# Patient Record
Sex: MALE | Race: WHITE | NOT HISPANIC OR LATINO
[De-identification: names, ages, dates, MRNs, and addresses within clinical notes are randomized per-mention and may not be internally consistent; named-entity substitution may affect disease eponyms.]

---

## 2017-09-17 ENCOUNTER — RESULT REVIEW (OUTPATIENT)
Age: 62
End: 2017-09-17

## 2017-09-17 ENCOUNTER — TRANSCRIPTION ENCOUNTER (OUTPATIENT)
Age: 62
End: 2017-09-17

## 2017-09-17 ENCOUNTER — INPATIENT (INPATIENT)
Facility: HOSPITAL | Age: 62
LOS: 1 days | Discharge: HOME | DRG: 605 | End: 2017-09-19
Attending: SURGERY | Admitting: SURGERY
Payer: COMMERCIAL

## 2017-09-17 VITALS
SYSTOLIC BLOOD PRESSURE: 190 MMHG | OXYGEN SATURATION: 95 % | HEART RATE: 112 BPM | RESPIRATION RATE: 20 BRPM | DIASTOLIC BLOOD PRESSURE: 93 MMHG | TEMPERATURE: 99 F

## 2017-09-17 DIAGNOSIS — S01.01XA LACERATION WITHOUT FOREIGN BODY OF SCALP, INITIAL ENCOUNTER: ICD-10-CM

## 2017-09-17 LAB
ALBUMIN SERPL ELPH-MCNC: 4.2 G/DL — SIGNIFICANT CHANGE UP (ref 3.3–5.2)
ALP SERPL-CCNC: 94 U/L — SIGNIFICANT CHANGE UP (ref 40–120)
ALT FLD-CCNC: 36 U/L — SIGNIFICANT CHANGE UP
AMYLASE P1 CFR SERPL: 41 U/L — SIGNIFICANT CHANGE UP (ref 36–128)
ANION GAP SERPL CALC-SCNC: 18 MMOL/L — HIGH (ref 5–17)
APPEARANCE UR: CLEAR — SIGNIFICANT CHANGE UP
APTT BLD: 31 SEC — SIGNIFICANT CHANGE UP (ref 27.5–37.4)
AST SERPL-CCNC: 37 U/L — SIGNIFICANT CHANGE UP
BACTERIA # UR AUTO: SIGNIFICANT CHANGE UP
BASOPHILS # BLD AUTO: 0.1 K/UL — SIGNIFICANT CHANGE UP (ref 0–0.2)
BASOPHILS NFR BLD AUTO: 0.6 % — SIGNIFICANT CHANGE UP (ref 0–2)
BILIRUB SERPL-MCNC: 0.8 MG/DL — SIGNIFICANT CHANGE UP (ref 0.4–2)
BILIRUB UR-MCNC: NEGATIVE — SIGNIFICANT CHANGE UP
BLD GP AB SCN SERPL QL: SIGNIFICANT CHANGE UP
BUN SERPL-MCNC: 10 MG/DL — SIGNIFICANT CHANGE UP (ref 8–20)
CALCIUM SERPL-MCNC: 8.7 MG/DL — SIGNIFICANT CHANGE UP (ref 8.6–10.2)
CHLORIDE SERPL-SCNC: 102 MMOL/L — SIGNIFICANT CHANGE UP (ref 98–107)
CO2 SERPL-SCNC: 20 MMOL/L — LOW (ref 22–29)
COLOR SPEC: YELLOW — SIGNIFICANT CHANGE UP
COMMENT - URINE: SIGNIFICANT CHANGE UP
CREAT SERPL-MCNC: 1.16 MG/DL — SIGNIFICANT CHANGE UP (ref 0.5–1.3)
DIFF PNL FLD: ABNORMAL
EOSINOPHIL # BLD AUTO: 0.4 K/UL — SIGNIFICANT CHANGE UP (ref 0–0.5)
EOSINOPHIL NFR BLD AUTO: 2.6 % — SIGNIFICANT CHANGE UP (ref 0–5)
EPI CELLS # UR: SIGNIFICANT CHANGE UP
ETHANOL SERPL-MCNC: 94 MG/DL — SIGNIFICANT CHANGE UP
GLUCOSE SERPL-MCNC: 105 MG/DL — SIGNIFICANT CHANGE UP (ref 70–115)
GLUCOSE UR QL: NEGATIVE MG/DL — SIGNIFICANT CHANGE UP
HCT VFR BLD CALC: 44 % — SIGNIFICANT CHANGE UP (ref 42–52)
HGB BLD-MCNC: 15.8 G/DL — SIGNIFICANT CHANGE UP (ref 14–18)
INR BLD: 1.05 RATIO — SIGNIFICANT CHANGE UP (ref 0.88–1.16)
KETONES UR-MCNC: ABNORMAL
LACTATE SERPL-SCNC: 1.4 MMOL/L — SIGNIFICANT CHANGE UP (ref 0.5–2)
LEUKOCYTE ESTERASE UR-ACNC: NEGATIVE — SIGNIFICANT CHANGE UP
LIDOCAIN IGE QN: 40 U/L — SIGNIFICANT CHANGE UP (ref 22–51)
LYMPHOCYTES # BLD AUTO: 28.1 % — SIGNIFICANT CHANGE UP (ref 20–55)
LYMPHOCYTES # BLD AUTO: 3.9 K/UL — SIGNIFICANT CHANGE UP (ref 1–4.8)
MCHC RBC-ENTMCNC: 33.5 PG — HIGH (ref 27–31)
MCHC RBC-ENTMCNC: 35.9 G/DL — SIGNIFICANT CHANGE UP (ref 32–36)
MCV RBC AUTO: 93.4 FL — SIGNIFICANT CHANGE UP (ref 80–94)
MONOCYTES # BLD AUTO: 1.2 K/UL — HIGH (ref 0–0.8)
MONOCYTES NFR BLD AUTO: 8.9 % — SIGNIFICANT CHANGE UP (ref 3–10)
NEUTROPHILS # BLD AUTO: 8 K/UL — SIGNIFICANT CHANGE UP (ref 1.8–8)
NEUTROPHILS NFR BLD AUTO: 57.5 % — SIGNIFICANT CHANGE UP (ref 37–73)
NITRITE UR-MCNC: POSITIVE
PH UR: 7 — SIGNIFICANT CHANGE UP (ref 5–8)
PLATELET # BLD AUTO: 301 K/UL — SIGNIFICANT CHANGE UP (ref 150–400)
POTASSIUM SERPL-MCNC: 3.1 MMOL/L — LOW (ref 3.5–5.3)
POTASSIUM SERPL-SCNC: 3.1 MMOL/L — LOW (ref 3.5–5.3)
PROT SERPL-MCNC: 8.1 G/DL — SIGNIFICANT CHANGE UP (ref 6.6–8.7)
PROT UR-MCNC: 30 MG/DL
PROTHROM AB SERPL-ACNC: 11.6 SEC — SIGNIFICANT CHANGE UP (ref 9.8–12.7)
RBC # BLD: 4.71 M/UL — SIGNIFICANT CHANGE UP (ref 4.6–6.2)
RBC # FLD: 12 % — SIGNIFICANT CHANGE UP (ref 11–15.6)
RBC CASTS # UR COMP ASSIST: SIGNIFICANT CHANGE UP /HPF (ref 0–4)
SODIUM SERPL-SCNC: 140 MMOL/L — SIGNIFICANT CHANGE UP (ref 135–145)
SP GR SPEC: 1 — LOW (ref 1.01–1.02)
TYPE + AB SCN PNL BLD: SIGNIFICANT CHANGE UP
UROBILINOGEN FLD QL: NEGATIVE MG/DL — SIGNIFICANT CHANGE UP
WBC # BLD: 14 K/UL — HIGH (ref 4.8–10.8)
WBC # FLD AUTO: 14 K/UL — HIGH (ref 4.8–10.8)
WBC UR QL: SIGNIFICANT CHANGE UP

## 2017-09-17 PROCEDURE — 71010: CPT | Mod: 26

## 2017-09-17 PROCEDURE — 13121 CMPLX RPR S/A/L 2.6-7.5 CM: CPT

## 2017-09-17 PROCEDURE — 70450 CT HEAD/BRAIN W/O DYE: CPT | Mod: 26

## 2017-09-17 PROCEDURE — 71010: CPT | Mod: 26,77

## 2017-09-17 PROCEDURE — 99291 CRITICAL CARE FIRST HOUR: CPT

## 2017-09-17 PROCEDURE — 88300 SURGICAL PATH GROSS: CPT | Mod: 26

## 2017-09-17 PROCEDURE — 74177 CT ABD & PELVIS W/CONTRAST: CPT | Mod: 26

## 2017-09-17 PROCEDURE — 71260 CT THORAX DX C+: CPT | Mod: 26

## 2017-09-17 PROCEDURE — 13122 CMPLX RPR S/A/L ADDL 5 CM/>: CPT | Mod: 59

## 2017-09-17 PROCEDURE — 72125 CT NECK SPINE W/O DYE: CPT | Mod: 26

## 2017-09-17 RX ORDER — ONDANSETRON 8 MG/1
4 TABLET, FILM COATED ORAL EVERY 8 HOURS
Qty: 0 | Refills: 0 | Status: DISCONTINUED | OUTPATIENT
Start: 2017-09-17 | End: 2017-09-19

## 2017-09-17 RX ORDER — OXYCODONE AND ACETAMINOPHEN 5; 325 MG/1; MG/1
2 TABLET ORAL EVERY 4 HOURS
Qty: 0 | Refills: 0 | Status: DISCONTINUED | OUTPATIENT
Start: 2017-09-17 | End: 2017-09-19

## 2017-09-17 RX ORDER — SODIUM CHLORIDE 9 MG/ML
1000 INJECTION, SOLUTION INTRAVENOUS
Qty: 0 | Refills: 0 | Status: DISCONTINUED | OUTPATIENT
Start: 2017-09-17 | End: 2017-09-18

## 2017-09-17 RX ORDER — CEFAZOLIN SODIUM 1 G
2000 VIAL (EA) INJECTION ONCE
Qty: 0 | Refills: 0 | Status: COMPLETED | OUTPATIENT
Start: 2017-09-17 | End: 2017-09-17

## 2017-09-17 RX ORDER — ACETAMINOPHEN 500 MG
650 TABLET ORAL EVERY 6 HOURS
Qty: 0 | Refills: 0 | Status: DISCONTINUED | OUTPATIENT
Start: 2017-09-17 | End: 2017-09-19

## 2017-09-17 RX ORDER — SODIUM CHLORIDE 9 MG/ML
1000 INJECTION INTRAMUSCULAR; INTRAVENOUS; SUBCUTANEOUS ONCE
Qty: 0 | Refills: 0 | Status: COMPLETED | OUTPATIENT
Start: 2017-09-17 | End: 2017-09-17

## 2017-09-17 RX ORDER — DOCUSATE SODIUM 100 MG
100 CAPSULE ORAL THREE TIMES A DAY
Qty: 0 | Refills: 0 | Status: DISCONTINUED | OUTPATIENT
Start: 2017-09-17 | End: 2017-09-19

## 2017-09-17 RX ORDER — HYDROCORTISONE 20 MG
100 TABLET ORAL ONCE
Qty: 0 | Refills: 0 | Status: COMPLETED | OUTPATIENT
Start: 2017-09-18 | End: 2017-09-18

## 2017-09-17 RX ORDER — HEPARIN SODIUM 5000 [USP'U]/ML
5000 INJECTION INTRAVENOUS; SUBCUTANEOUS EVERY 8 HOURS
Qty: 0 | Refills: 0 | Status: DISCONTINUED | OUTPATIENT
Start: 2017-09-17 | End: 2017-09-17

## 2017-09-17 RX ORDER — POTASSIUM CHLORIDE 20 MEQ
10 PACKET (EA) ORAL
Qty: 0 | Refills: 0 | Status: COMPLETED | OUTPATIENT
Start: 2017-09-17 | End: 2017-09-17

## 2017-09-17 RX ORDER — TETANUS TOXOID, REDUCED DIPHTHERIA TOXOID AND ACELLULAR PERTUSSIS VACCINE, ADSORBED 5; 2.5; 8; 8; 2.5 [IU]/.5ML; [IU]/.5ML; UG/.5ML; UG/.5ML; UG/.5ML
0.5 SUSPENSION INTRAMUSCULAR ONCE
Qty: 0 | Refills: 0 | Status: COMPLETED | OUTPATIENT
Start: 2017-09-17 | End: 2017-09-17

## 2017-09-17 RX ORDER — HYDROCORTISONE 20 MG
50 TABLET ORAL EVERY 8 HOURS
Qty: 0 | Refills: 0 | Status: COMPLETED | OUTPATIENT
Start: 2017-09-18 | End: 2017-09-18

## 2017-09-17 RX ORDER — OXYCODONE AND ACETAMINOPHEN 5; 325 MG/1; MG/1
1 TABLET ORAL EVERY 6 HOURS
Qty: 0 | Refills: 0 | Status: DISCONTINUED | OUTPATIENT
Start: 2017-09-17 | End: 2017-09-19

## 2017-09-17 RX ORDER — SENNA PLUS 8.6 MG/1
2 TABLET ORAL AT BEDTIME
Qty: 0 | Refills: 0 | Status: DISCONTINUED | OUTPATIENT
Start: 2017-09-17 | End: 2017-09-19

## 2017-09-17 RX ORDER — FENTANYL CITRATE 50 UG/ML
50 INJECTION INTRAVENOUS
Qty: 0 | Refills: 0 | Status: DISCONTINUED | OUTPATIENT
Start: 2017-09-17 | End: 2017-09-18

## 2017-09-17 RX ORDER — IPRATROPIUM/ALBUTEROL SULFATE 18-103MCG
3 AEROSOL WITH ADAPTER (GRAM) INHALATION EVERY 6 HOURS
Qty: 0 | Refills: 0 | Status: DISCONTINUED | OUTPATIENT
Start: 2017-09-17 | End: 2017-09-18

## 2017-09-17 RX ORDER — ENOXAPARIN SODIUM 100 MG/ML
30 INJECTION SUBCUTANEOUS EVERY 12 HOURS
Qty: 0 | Refills: 0 | Status: DISCONTINUED | OUTPATIENT
Start: 2017-09-17 | End: 2017-09-17

## 2017-09-17 RX ORDER — SODIUM CHLORIDE 9 MG/ML
1000 INJECTION, SOLUTION INTRAVENOUS ONCE
Qty: 0 | Refills: 0 | Status: COMPLETED | OUTPATIENT
Start: 2017-09-17 | End: 2017-09-17

## 2017-09-17 RX ORDER — ONDANSETRON 8 MG/1
4 TABLET, FILM COATED ORAL ONCE
Qty: 0 | Refills: 0 | Status: DISCONTINUED | OUTPATIENT
Start: 2017-09-17 | End: 2017-09-18

## 2017-09-17 RX ORDER — ENOXAPARIN SODIUM 100 MG/ML
30 INJECTION SUBCUTANEOUS
Qty: 0 | Refills: 0 | Status: DISCONTINUED | OUTPATIENT
Start: 2017-09-17 | End: 2017-09-19

## 2017-09-17 RX ADMIN — SODIUM CHLORIDE 125 MILLILITER(S): 9 INJECTION, SOLUTION INTRAVENOUS at 23:50

## 2017-09-17 RX ADMIN — SODIUM CHLORIDE 1000 MILLILITER(S): 9 INJECTION, SOLUTION INTRAVENOUS at 18:34

## 2017-09-17 RX ADMIN — Medication 100 MILLIEQUIVALENT(S): at 22:42

## 2017-09-17 RX ADMIN — Medication 100 MILLIGRAM(S): at 17:10

## 2017-09-17 RX ADMIN — Medication 100 MILLIEQUIVALENT(S): at 23:38

## 2017-09-17 RX ADMIN — SODIUM CHLORIDE 125 MILLILITER(S): 9 INJECTION, SOLUTION INTRAVENOUS at 18:26

## 2017-09-17 RX ADMIN — Medication 100 MILLIEQUIVALENT(S): at 18:26

## 2017-09-17 RX ADMIN — Medication 3 MILLILITER(S): at 22:30

## 2017-09-17 RX ADMIN — SODIUM CHLORIDE 6000 MILLILITER(S): 9 INJECTION INTRAMUSCULAR; INTRAVENOUS; SUBCUTANEOUS at 17:00

## 2017-09-17 RX ADMIN — SODIUM CHLORIDE 100 MILLILITER(S): 9 INJECTION, SOLUTION INTRAVENOUS at 22:30

## 2017-09-17 RX ADMIN — TETANUS TOXOID, REDUCED DIPHTHERIA TOXOID AND ACELLULAR PERTUSSIS VACCINE, ADSORBED 0.5 MILLILITER(S): 5; 2.5; 8; 8; 2.5 SUSPENSION INTRAMUSCULAR at 17:10

## 2017-09-17 NOTE — ED PROVIDER NOTE - MEDICAL DECISION MAKING DETAILS
large laceration 15cm, forhead to midoccipital scalp region, cranium visualized. will adm for lac repair in or

## 2017-09-17 NOTE — BRIEF OPERATIVE NOTE - OPERATION/FINDINGS
complex scalp laceration measuring approximately 20 cm. Wound was irrigated with copious amounts of normal saline. Two Galea lacerations were repaired with 3-0 Vicryl simple sutures. His wound was closed complex scalp laceration measuring approximately 20 cm. Wound was irrigated with copious amounts of normal saline. Two Galea lacerations were repaired with 3-0 Vicryl simple sutures. His wound was closed with 3-0 Vicryl subcuticular simple from the middle of the laceration up to his forehead, the closure was reinforced with 5-0 Monocryl running subcuticular sutures. The bottom half of the wound was closed with interlocking running sutures using nylon.

## 2017-09-17 NOTE — H&P ADULT - PROBLEM SELECTOR PLAN 1
-plan for laceration repair in OR   -repeat lactate  -IVF, NPO  -pain medication  -hold DVT prophylaxis until tomorrow AM
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
## 2017-09-17 NOTE — ED PROVIDER NOTE - CRITICAL CARE PROVIDED
40 MINUTES/documentation/additional history taking/direct patient care (not related to procedure)/interpretation of diagnostic studies/consultation with other physicians

## 2017-09-17 NOTE — H&P ADULT - NSHPPHYSICALEXAM_GEN_ALL_CORE
Constitutional: Well-developed well nourished male in no acute distress  HEENT: Head is normocephalic. extensive forehead laceration that extends to scalp. approximately 15cm. maxillofacial structures stable, no blood or discharge from oral cavity, dried blood in left nostril, no ceja sign / raccoon eyes, EOMI b/l, pupils 3mm round and reactive to light b/l, no active drainage or redness  Neck: cervical collar in place, trachea midline  Respiratory: Breath sounds CTA b/l respirations are unlabored, no accessory muscle use, no conversational dyspnea  Cardiovascular: Regular rate & rhythm, +S1, S2  Chest: Chest wall is non-tender to palpation, no subQ emphysema or crepitus palpated  Gastrointestinal: Abdomen soft, non-tender, non-distended, no rebound tenderness / guarding, no ecchymosis or external signs of abdominal trauma  Extremities: moving all extremities spontaneously, no point tenderness or deformity noted to upper or lower extremities b/l  Pelvis: stable  Vascular: 2+ radial, femoral, and DP pulses b/l  Neurological: GCS: 15 (4/5/6). A&O x 3; no gross sensory / motor / coordination deficits  Musculoskeletal: 5/5 strength of upper and lower extremities b/l  Back: no C/T/LS spine tenderness to palpation, no step-offs or signs of external trauma to the back

## 2017-09-17 NOTE — ED ADULT NURSE REASSESSMENT NOTE - NS ED NURSE REASSESS COMMENT FT1
Report handed off to Pearl WOODARD in the OR. Pre-op checklist completed. Belongings locked with security. Valuables slip placed on chart #[id number].
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
## 2017-09-18 DIAGNOSIS — J44.9 CHRONIC OBSTRUCTIVE PULMONARY DISEASE, UNSPECIFIED: ICD-10-CM

## 2017-09-18 LAB
ANION GAP SERPL CALC-SCNC: 12 MMOL/L — SIGNIFICANT CHANGE UP (ref 5–17)
BASOPHILS # BLD AUTO: 0 K/UL — SIGNIFICANT CHANGE UP (ref 0–0.2)
BASOPHILS NFR BLD AUTO: 0.3 % — SIGNIFICANT CHANGE UP (ref 0–2)
BUN SERPL-MCNC: 10 MG/DL — SIGNIFICANT CHANGE UP (ref 8–20)
CALCIUM SERPL-MCNC: 8.5 MG/DL — LOW (ref 8.6–10.2)
CHLORIDE SERPL-SCNC: 106 MMOL/L — SIGNIFICANT CHANGE UP (ref 98–107)
CO2 SERPL-SCNC: 22 MMOL/L — SIGNIFICANT CHANGE UP (ref 22–29)
CREAT SERPL-MCNC: 0.94 MG/DL — SIGNIFICANT CHANGE UP (ref 0.5–1.3)
GLUCOSE SERPL-MCNC: 156 MG/DL — HIGH (ref 70–115)
HCT VFR BLD CALC: 41 % — LOW (ref 42–52)
HGB BLD-MCNC: 14.2 G/DL — SIGNIFICANT CHANGE UP (ref 14–18)
LYMPHOCYTES # BLD AUTO: 0.9 K/UL — LOW (ref 1–4.8)
LYMPHOCYTES # BLD AUTO: 7.7 % — LOW (ref 20–55)
MAGNESIUM SERPL-MCNC: 2.3 MG/DL — SIGNIFICANT CHANGE UP (ref 1.6–2.6)
MCHC RBC-ENTMCNC: 32.6 PG — HIGH (ref 27–31)
MCHC RBC-ENTMCNC: 34.6 G/DL — SIGNIFICANT CHANGE UP (ref 32–36)
MCV RBC AUTO: 94.3 FL — HIGH (ref 80–94)
MONOCYTES # BLD AUTO: 0.2 K/UL — SIGNIFICANT CHANGE UP (ref 0–0.8)
MONOCYTES NFR BLD AUTO: 1.5 % — LOW (ref 3–10)
NEUTROPHILS # BLD AUTO: 10.5 K/UL — HIGH (ref 1.8–8)
NEUTROPHILS NFR BLD AUTO: 89 % — HIGH (ref 37–73)
PHOSPHATE SERPL-MCNC: 2.5 MG/DL — SIGNIFICANT CHANGE UP (ref 2.4–4.7)
PLATELET # BLD AUTO: 263 K/UL — SIGNIFICANT CHANGE UP (ref 150–400)
POTASSIUM SERPL-MCNC: 4 MMOL/L — SIGNIFICANT CHANGE UP (ref 3.5–5.3)
POTASSIUM SERPL-SCNC: 4 MMOL/L — SIGNIFICANT CHANGE UP (ref 3.5–5.3)
RBC # BLD: 4.35 M/UL — LOW (ref 4.6–6.2)
RBC # FLD: 12 % — SIGNIFICANT CHANGE UP (ref 11–15.6)
SODIUM SERPL-SCNC: 140 MMOL/L — SIGNIFICANT CHANGE UP (ref 135–145)
WBC # BLD: 11.8 K/UL — HIGH (ref 4.8–10.8)
WBC # FLD AUTO: 11.8 K/UL — HIGH (ref 4.8–10.8)

## 2017-09-18 RX ORDER — ALBUTEROL 90 UG/1
1 AEROSOL, METERED ORAL EVERY 4 HOURS
Qty: 0 | Refills: 0 | Status: DISCONTINUED | OUTPATIENT
Start: 2017-09-18 | End: 2017-09-19

## 2017-09-18 RX ORDER — BACITRACIN ZINC 500 UNIT/G
1 OINTMENT IN PACKET (EA) TOPICAL
Qty: 0 | Refills: 0 | Status: DISCONTINUED | OUTPATIENT
Start: 2017-09-18 | End: 2017-09-19

## 2017-09-18 RX ORDER — TIOTROPIUM BROMIDE 18 UG/1
1 CAPSULE ORAL; RESPIRATORY (INHALATION) DAILY
Qty: 0 | Refills: 0 | Status: DISCONTINUED | OUTPATIENT
Start: 2017-09-18 | End: 2017-09-19

## 2017-09-18 RX ORDER — IPRATROPIUM/ALBUTEROL SULFATE 18-103MCG
3 AEROSOL WITH ADAPTER (GRAM) INHALATION EVERY 6 HOURS
Qty: 0 | Refills: 0 | Status: DISCONTINUED | OUTPATIENT
Start: 2017-09-18 | End: 2017-09-19

## 2017-09-18 RX ADMIN — Medication 100 MILLIGRAM(S): at 13:16

## 2017-09-18 RX ADMIN — Medication 3 MILLILITER(S): at 20:04

## 2017-09-18 RX ADMIN — Medication 1 APPLICATION(S): at 18:08

## 2017-09-18 RX ADMIN — ENOXAPARIN SODIUM 30 MILLIGRAM(S): 100 INJECTION SUBCUTANEOUS at 18:08

## 2017-09-18 RX ADMIN — Medication 50 MILLIGRAM(S): at 23:04

## 2017-09-18 RX ADMIN — Medication 100 MILLIGRAM(S): at 05:26

## 2017-09-18 RX ADMIN — Medication 3 MILLILITER(S): at 15:54

## 2017-09-18 RX ADMIN — Medication 650 MILLIGRAM(S): at 05:31

## 2017-09-18 RX ADMIN — Medication 50 MILLIGRAM(S): at 13:15

## 2017-09-18 RX ADMIN — ENOXAPARIN SODIUM 30 MILLIGRAM(S): 100 INJECTION SUBCUTANEOUS at 05:26

## 2017-09-18 RX ADMIN — Medication 650 MILLIGRAM(S): at 05:33

## 2017-09-18 NOTE — PROGRESS NOTE ADULT - SUBJECTIVE AND OBJECTIVE BOX
HPI/OVERNIGHT EVENTS: Patient seen and examined at bedside this AM - late chart entry. He reports that pain is mild, localized to around his scalp laceration and is well controlled with current medication regimen. States he has been tolerating diet without abd pain, nausea, or vomiting. Reports being OOB and ambulating independently, voiding without difficulty, and passing flatus. He denies feelings of fever, chills, CP, or SOB.    MEDICATIONS  (STANDING):  lactated ringers. 1000 milliLiter(s) (125 mL/Hr) IV Continuous <Continuous>  hydrocortisone sodium succinate Injectable 50 milliGRAM(s) IV Push every 8 hours  enoxaparin Injectable 30 milliGRAM(s) SubCutaneous two times a day  docusate sodium 100 milliGRAM(s) Oral three times a day  senna 2 Tablet(s) Oral at bedtime  BACItracin   Ointment 1 Application(s) Topical two times a day  ALBUTerol/ipratropium for Nebulization 3 milliLiter(s) Nebulizer every 6 hours  ALBUTerol    90 MICROgram(s) HFA Inhaler 1 Puff(s) Inhalation every 4 hours  tiotropium 18 MICROgram(s) Capsule 1 Capsule(s) Inhalation daily    MEDICATIONS  (PRN):  oxyCODONE    5 mG/acetaminophen 325 mG 2 Tablet(s) Oral every 4 hours PRN Severe Pain (7 - 10)  oxyCODONE    5 mG/acetaminophen 325 mG 1 Tablet(s) Oral every 6 hours PRN Moderate Pain (4 - 6)  acetaminophen   Tablet. 650 milliGRAM(s) Oral every 6 hours PRN Mild Pain (1 - 3)  ondansetron Injectable 4 milliGRAM(s) IV Push every 8 hours PRN Nausea and/or Vomiting      Vital Signs Last 24 Hrs  T(C): 37 (18 Sep 2017 16:09), Max: 37.3 (17 Sep 2017 22:25)  T(F): 98.6 (18 Sep 2017 16:09), Max: 99.1 (17 Sep 2017 22:25)  HR: 93 (18 Sep 2017 16:09) (93 - 113)  BP: 150/83 (18 Sep 2017 16:09) (106/70 - 156/81)  BP(mean): --  RR: 18 (18 Sep 2017 16:09) (15 - 24)  SpO2: 95% (18 Sep 2017 16:09) (94% - 100%)    Constitutional: patient resting comfortably in bed, in no acute distress  HEENT: Dressing over scalp laceration C/D/I, removed at bedside revealing linear laceration to forehead with sutures in place, no active bleeding or surrounding erythema. EOMI / PERRL b/l, no active drainage or redness  Neck: No JVD, full ROM without pain  Respiratory: bilateral expiratory wheezes present, respirations are unlabored, no accessory muscle use, no conversational dyspnea  Cardiovascular: regular rate & rhythm  Gastrointestinal: Abdomen soft, non-tender, non-distended, no rebound tenderness / guarding  Neurological: GCS: 15 (4/5/6). A&O x 3; no gross sensory / motor / coordination deficits  Psychiatric: Normal mood, normal affect      I&O's Detail    17 Sep 2017 07:01  -  18 Sep 2017 07:00  --------------------------------------------------------  IN:    IV PiggyBack: 100 mL    lactated ringers.: 875 mL    lactated ringers.: 100 mL    Oral Fluid: 120 mL  Total IN: 1195 mL    OUT:    Voided: 1650 mL  Total OUT: 1650 mL    Total NET: -455 mL      18 Sep 2017 07:01  -  18 Sep 2017 18:05  --------------------------------------------------------  IN:  Total IN: 0 mL    OUT:    Voided: 700 mL  Total OUT: 700 mL    Total NET: -700 mL          LABS:                        14.2   11.8  )-----------( 263      ( 18 Sep 2017 06:21 )             41.0         140  |  106  |  10.0  ----------------------------<  156<H>  4.0   |  22.0  |  0.94    Ca    8.5<L>      18 Sep 2017 06:21  Phos  2.5       Mg     2.3         TPro  8.1  /  Alb  4.2  /  TBili  0.8  /  DBili  x   /  AST  37  /  ALT  36  /  AlkPhos  94      PT/INR - ( 17 Sep 2017 17:18 )   PT: 11.6 sec;   INR: 1.05 ratio         PTT - ( 17 Sep 2017 17:18 )  PTT:31.0 sec  Urinalysis Basic - ( 17 Sep 2017 18:57 )    Color: Yellow / Appearance: Clear / S.005 / pH: x  Gluc: x / Ketone: Trace  / Bili: Negative / Urobili: Negative mg/dL   Blood: x / Protein: 30 mg/dL / Nitrite: Positive   Leuk Esterase: Negative / RBC: 0-2 /HPF / WBC 3-5   Sq Epi: x / Non Sq Epi: x / Bacteria: Rare

## 2017-09-18 NOTE — PROGRESS NOTE ADULT - PROBLEM SELECTOR PLAN 1
- Laceration may remain open to air  - Bacitracin to be applied to wound BID  - Pain control as needed  - Continue regular diet  - DVT ppx with lovenox  - Senna and colace for bowel regimen  - Incentive spirometer for pulm toileting  - Dispo planning likely for tomorrow 9/19

## 2017-09-18 NOTE — PROGRESS NOTE ADULT - SUBJECTIVE AND OBJECTIVE BOX
Anesthesia Post Op Note:      INTERVAL HPI/OVERNIGHT EVENTS:    MEDICATIONS  (STANDING):  lactated ringers. 1000 milliLiter(s) (125 mL/Hr) IV Continuous <Continuous>  hydrocortisone sodium succinate Injectable 50 milliGRAM(s) IV Push every 8 hours  enoxaparin Injectable 30 milliGRAM(s) SubCutaneous two times a day  docusate sodium 100 milliGRAM(s) Oral three times a day  senna 2 Tablet(s) Oral at bedtime    MEDICATIONS  (PRN):  ALBUTerol/ipratropium for Nebulization 3 milliLiter(s) Nebulizer every 6 hours PRN Shortness of Breath and/or Wheezing  oxyCODONE    5 mG/acetaminophen 325 mG 2 Tablet(s) Oral every 4 hours PRN Severe Pain (7 - 10)  oxyCODONE    5 mG/acetaminophen 325 mG 1 Tablet(s) Oral every 6 hours PRN Moderate Pain (4 - 6)  acetaminophen   Tablet. 650 milliGRAM(s) Oral every 6 hours PRN Mild Pain (1 - 3)  ondansetron Injectable 4 milliGRAM(s) IV Push every 8 hours PRN Nausea and/or Vomiting      Allergies    No Known Allergies    Intolerances        REVIEW OF SYSTEMS:    CONSTITUTIONAL: No fever, weight loss, or fatigue  EYES: No eye pain, visual disturbances, or discharge  ENMT:  No difficulty hearing, tinnitus, vertigo; No sinus or throat pain  NECK: No pain or stiffness  BREASTS: No pain, masses, or nipple discharge  RESPIRATORY: No cough, wheezing, chills or hemoptysis; No shortness of breath  CARDIOVASCULAR: No chest pain, palpitations, dizziness, or leg swelling  GASTROINTESTINAL: No abdominal or epigastric pain. No nausea, vomiting, or hematemesis; No diarrhea or constipation. No melena or hematochezia.  GENITOURINARY: No dysuria, frequency, hematuria, or incontinence  NEUROLOGICAL: No headaches, memory loss, loss of strength, numbness, or tremors  SKIN: No itching, burning, rashes, or lesions   LYMPH NODES: No enlarged glands  ENDOCRINE: No heat or cold intolerance; No hair loss  MUSCULOSKELETAL: No joint pain or swelling; No muscle, back, or extremity pain  PSYCHIATRIC: No depression, anxiety, mood swings, or difficulty sleeping  HEME/LYMPH: No easy bruising, or bleeding gums  ALLERY AND IMMUNOLOGIC: No hives or eczema    Vital Signs Last 24 Hrs  T(C): 37 (18 Sep 2017 08:46), Max: 37.3 (17 Sep 2017 22:25)  T(F): 98.6 (18 Sep 2017 08:46), Max: 99.1 (17 Sep 2017 22:25)  HR: 97 (18 Sep 2017 08:46) (93 - 113)  BP: 145/98 (18 Sep 2017 08:46) (106/70 - 190/93)  BP(mean): --  RR: 18 (18 Sep 2017 08:46) (15 - 24)  SpO2: 99% (18 Sep 2017 08:46) (94% - 100%)        PHYSICAL EXAM:    GENERAL: NAD, well-groomed, well-developed  HEAD:  Atraumatic, Normocephalic  EYES: EOMI, PERRLA, conjunctiva and sclera clear  ENMT: No tonsillar erythema, exudates, or enlargement; Moist mucous membranes, Good dentition, No lesions  NECK: Supple, No JVD, Normal thyroid  NERVOUS SYSTEM:  Alert & Oriented X3, Good concentration; Motor Strength 5/5 B/L upper and lower extremities; DTRs 2+ intact and symmetric  CHEST/LUNG: Clear to percussion bilaterally; No rales, rhonchi, wheezing, or rubs  HEART: Regular rate and rhythm; No murmurs, rubs, or gallops  ABDOMEN: Soft, Nontender, Nondistended; Bowel sounds present  EXTREMITIES:  2+ Peripheral Pulses, No clubbing, cyanosis, or edema  LYMPH: No lymphadenopathy noted  SKIN: No rashes or lesions      LABS:                        14.2   11.8  )-----------( 263      ( 18 Sep 2017 06:21 )             41.0         140  |  106  |  10.0  ----------------------------<  156<H>  4.0   |  22.0  |  0.94    Ca    8.5<L>      18 Sep 2017 06:21  Phos  2.5       Mg     2.3         TPro  8.1  /  Alb  4.2  /  TBili  0.8  /  DBili  x   /  AST  37  /  ALT  36  /  AlkPhos  94  -17    PT/INR - ( 17 Sep 2017 17:18 )   PT: 11.6 sec;   INR: 1.05 ratio         PTT - ( 17 Sep 2017 17:18 )  PTT:31.0 sec  Urinalysis Basic - ( 17 Sep 2017 18:57 )    Color: Yellow / Appearance: Clear / S.005 / pH: x  Gluc: x / Ketone: Trace  / Bili: Negative / Urobili: Negative mg/dL   Blood: x / Protein: 30 mg/dL / Nitrite: Positive   Leuk Esterase: Negative / RBC: 0-2 /HPF / WBC 3-5   Sq Epi: x / Non Sq Epi: x / Bacteria: Rare        RADIOLOGY & ADDITIONAL TESTS:

## 2017-09-18 NOTE — PROGRESS NOTE ADULT - PROBLEM SELECTOR PLAN 2
- Last 2 doses of IV steroid taper to be finished today  - Duonebs ordered q6 hours for wheezing noted on physical exam  - Continue pulmonary toileting
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
## 2017-09-18 NOTE — PROGRESS NOTE ADULT - ASSESSMENT
POD #1 s/p GETA. A&Ox3/VSS. Denies any throat pain or hoarseness. No N/V and pain controlled. No questions or complications r/t yest GA.

## 2017-09-19 ENCOUNTER — TRANSCRIPTION ENCOUNTER (OUTPATIENT)
Age: 62
End: 2017-09-19

## 2017-09-19 VITALS
HEART RATE: 92 BPM | TEMPERATURE: 98 F | SYSTOLIC BLOOD PRESSURE: 123 MMHG | RESPIRATION RATE: 18 BRPM | DIASTOLIC BLOOD PRESSURE: 69 MMHG | OXYGEN SATURATION: 95 %

## 2017-09-19 PROCEDURE — 70450 CT HEAD/BRAIN W/O DYE: CPT

## 2017-09-19 PROCEDURE — 96375 TX/PRO/DX INJ NEW DRUG ADDON: CPT | Mod: XU

## 2017-09-19 PROCEDURE — 86850 RBC ANTIBODY SCREEN: CPT

## 2017-09-19 PROCEDURE — 85610 PROTHROMBIN TIME: CPT

## 2017-09-19 PROCEDURE — 71260 CT THORAX DX C+: CPT

## 2017-09-19 PROCEDURE — 80053 COMPREHEN METABOLIC PANEL: CPT

## 2017-09-19 PROCEDURE — 90471 IMMUNIZATION ADMIN: CPT

## 2017-09-19 PROCEDURE — 86901 BLOOD TYPING SEROLOGIC RH(D): CPT

## 2017-09-19 PROCEDURE — 83690 ASSAY OF LIPASE: CPT

## 2017-09-19 PROCEDURE — 83605 ASSAY OF LACTIC ACID: CPT

## 2017-09-19 PROCEDURE — 81001 URINALYSIS AUTO W/SCOPE: CPT

## 2017-09-19 PROCEDURE — 99291 CRITICAL CARE FIRST HOUR: CPT | Mod: 25

## 2017-09-19 PROCEDURE — 90715 TDAP VACCINE 7 YRS/> IM: CPT

## 2017-09-19 PROCEDURE — 85730 THROMBOPLASTIN TIME PARTIAL: CPT

## 2017-09-19 PROCEDURE — 88300 SURGICAL PATH GROSS: CPT

## 2017-09-19 PROCEDURE — 84100 ASSAY OF PHOSPHORUS: CPT

## 2017-09-19 PROCEDURE — 71045 X-RAY EXAM CHEST 1 VIEW: CPT

## 2017-09-19 PROCEDURE — 96374 THER/PROPH/DIAG INJ IV PUSH: CPT | Mod: XU

## 2017-09-19 PROCEDURE — 80048 BASIC METABOLIC PNL TOTAL CA: CPT

## 2017-09-19 PROCEDURE — 94640 AIRWAY INHALATION TREATMENT: CPT

## 2017-09-19 PROCEDURE — 36415 COLL VENOUS BLD VENIPUNCTURE: CPT

## 2017-09-19 PROCEDURE — 80307 DRUG TEST PRSMV CHEM ANLYZR: CPT

## 2017-09-19 PROCEDURE — 85027 COMPLETE CBC AUTOMATED: CPT

## 2017-09-19 PROCEDURE — 74177 CT ABD & PELVIS W/CONTRAST: CPT

## 2017-09-19 PROCEDURE — 86900 BLOOD TYPING SEROLOGIC ABO: CPT

## 2017-09-19 PROCEDURE — 83735 ASSAY OF MAGNESIUM: CPT

## 2017-09-19 PROCEDURE — 82150 ASSAY OF AMYLASE: CPT

## 2017-09-19 PROCEDURE — 72125 CT NECK SPINE W/O DYE: CPT

## 2017-09-19 RX ORDER — BACITRACIN ZINC 500 UNIT/G
1 OINTMENT IN PACKET (EA) TOPICAL
Qty: 0 | Refills: 0 | COMMUNITY
Start: 2017-09-19

## 2017-09-19 RX ORDER — INFLUENZA VIRUS VACCINE 15; 15; 15; 15 UG/.5ML; UG/.5ML; UG/.5ML; UG/.5ML
0.5 SUSPENSION INTRAMUSCULAR ONCE
Qty: 0 | Refills: 0 | Status: DISCONTINUED | OUTPATIENT
Start: 2017-09-19 | End: 2017-09-19

## 2017-09-19 RX ORDER — ACETAMINOPHEN 500 MG
2 TABLET ORAL
Qty: 0 | Refills: 0 | COMMUNITY
Start: 2017-09-19

## 2017-09-19 RX ADMIN — Medication 3 MILLILITER(S): at 02:32

## 2017-09-19 RX ADMIN — Medication 3 MILLILITER(S): at 08:27

## 2017-09-19 RX ADMIN — ENOXAPARIN SODIUM 30 MILLIGRAM(S): 100 INJECTION SUBCUTANEOUS at 05:49

## 2017-09-19 RX ADMIN — Medication 1 APPLICATION(S): at 05:49

## 2017-09-19 RX ADMIN — Medication 3 MILLILITER(S): at 14:04

## 2017-09-19 NOTE — DISCHARGE NOTE ADULT - PATIENT PORTAL LINK FT
“You can access the [facility] Patient Portal, offered by [facility], by registering with the following website: [email or web address]”

## 2017-09-19 NOTE — DISCHARGE NOTE ADULT - CARE PLAN
Principal Discharge DX:	Laceration of scalp, initial encounter  Goal:	Alleviation of pain and symptoms  Instructions for follow-up, activity and diet:	Follow up: Please call and make an appointment with the [facility] on TUESDAY 9/26/17 for wound re-evaluation and suture removal. Also, please call and make an appointment with your primary care physician as per your usual schedule.   Activity: May return to normal activities as tolerated.  Diet: May continue regular diet.  Medications: Please take all home medications as prescribed by your primary care doctor. You may take over-the-counter tylenol for pain control as needed. Continue to place bacitracin over your scalp laceration twice a day to assist with wound healing.  Wound Care: Please, keep wound site clean and dry. You may shower, but do not bathe.   Patient is advised to RETURN TO THE EMERGENCY DEPARTMENT for any of the following - worsening pain, fever/chills, nausea/vomiting, altered mental status, chest pain, shortness of breath, or any other new / worsening symptom.  Secondary Diagnosis:	COPD (chronic obstructive pulmonary disease)  Instructions for follow-up, activity and diet:	Please call and make a [facility] for [facility] management of your COPD and cough. You finished a course of IV steroids while in the hospital which were tapered down - you do not need to be discharged home on any steroids.
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
## 2017-09-19 NOTE — DISCHARGE NOTE ADULT - MEDICATION SUMMARY - MEDICATIONS TO TAKE
I will START or STAY ON the medications listed below when I get home from the hospital:    acetaminophen 325 mg oral tablet  -- 2 tab(s) by mouth every 6 hours, As needed, Mild Pain (1 - 3)  -- Indication: For Pain    bacitracin 500 units/g topical ointment  -- 1 application on skin 2 times a day  -- Indication: For Wound care

## 2017-09-19 NOTE — DISCHARGE NOTE ADULT - PROVIDER TOKENS
FREE:[LAST:[[facility]],PHONE:[phone]],FAX:[(   [phone]    -],ADDRESS:[[address] [place] [address] - [address] [place] [address]]]

## 2017-09-19 NOTE — DISCHARGE NOTE ADULT - PLAN OF CARE
Alleviation of pain and symptoms Follow up: Please call and make an appointment with the [facility] on TUESDAY 9/26/17 for wound re-evaluation and suture removal. Also, please call and make an appointment with your primary care physician as per your usual schedule.   Activity: May return to normal activities as tolerated.  Diet: May continue regular diet.  Medications: Please take all home medications as prescribed by your primary care doctor. You may take over-the-counter tylenol for pain control as needed. Continue to place bacitracin over your scalp laceration twice a day to assist with wound healing.  Wound Care: Please, keep wound site clean and dry. You may shower, but do not bathe.   Patient is advised to RETURN TO THE EMERGENCY DEPARTMENT for any of the following - worsening pain, fever/chills, nausea/vomiting, altered mental status, chest pain, shortness of breath, or any other new / worsening symptom. Please call and make a follow-up appointment [facility] for further management of your COPD and cough. You finished a course of IV steroids while in the hospital which were tapered down - you do not need to be discharged home on any steroids.

## 2017-09-19 NOTE — DISCHARGE NOTE ADULT - CARE PROVIDER_API CALL
[facility],   [facility] [address] - [address] [place] [address]  Phone: [phone]  Fax: (   )    -

## 2017-09-25 PROBLEM — Z00.00 ENCOUNTER FOR PREVENTIVE HEALTH EXAMINATION: Status: ACTIVE | Noted: 2017-09-25

## 2017-09-26 ENCOUNTER — APPOINTMENT (OUTPATIENT)
Dept: TRAUMA SURGERY | Facility: CLINIC | Age: 62
End: 2017-09-26
Payer: COMMERCIAL

## 2017-09-26 VITALS
HEART RATE: 72 BPM | WEIGHT: 262 LBS | DIASTOLIC BLOOD PRESSURE: 99 MMHG | SYSTOLIC BLOOD PRESSURE: 157 MMHG | OXYGEN SATURATION: 97 % | TEMPERATURE: 98.2 F | RESPIRATION RATE: 16 BRPM

## 2017-09-26 DIAGNOSIS — Z78.9 OTHER SPECIFIED HEALTH STATUS: ICD-10-CM

## 2017-09-26 DIAGNOSIS — Z87.09 PERSONAL HISTORY OF OTHER DISEASES OF THE RESPIRATORY SYSTEM: ICD-10-CM

## 2017-09-26 PROCEDURE — 99024 POSTOP FOLLOW-UP VISIT: CPT

## 2017-09-27 PROBLEM — Z87.09 HISTORY OF ASTHMA: Status: RESOLVED | Noted: 2017-09-26 | Resolved: 2017-09-27

## 2017-09-27 PROBLEM — Z78.9 NON-SMOKER: Status: ACTIVE | Noted: 2017-09-26

## 2017-09-27 LAB — SURGICAL PATHOLOGY FINAL REPORT - CH: SIGNIFICANT CHANGE UP

## 2017-09-29 ENCOUNTER — APPOINTMENT (OUTPATIENT)
Dept: TRAUMA SURGERY | Facility: CLINIC | Age: 62
End: 2017-09-29
Payer: COMMERCIAL

## 2017-09-29 VITALS
SYSTOLIC BLOOD PRESSURE: 138 MMHG | OXYGEN SATURATION: 99 % | HEART RATE: 72 BPM | TEMPERATURE: 97.9 F | DIASTOLIC BLOOD PRESSURE: 86 MMHG | RESPIRATION RATE: 16 BRPM | WEIGHT: 261.03 LBS

## 2017-09-29 DIAGNOSIS — S01.01XA LACERATION W/OUT FOREIGN BODY OF SCALP, INITIAL ENCOUNTER: ICD-10-CM
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
## 2017-09-29 PROCEDURE — 99024 POSTOP FOLLOW-UP VISIT: CPT
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
## 2017-10-03 PROBLEM — S01.01XA SCALP LACERATION: Status: ACTIVE | Noted: 2017-09-27

## 2018-07-23 PROBLEM — Z78.9 ALCOHOL USE: Status: ACTIVE | Noted: 2017-09-26

## 2022-05-18 PROBLEM — J45.909 UNSPECIFIED ASTHMA, UNCOMPLICATED: Chronic | Status: ACTIVE | Noted: 2017-09-17

## 2022-06-06 ENCOUNTER — APPOINTMENT (OUTPATIENT)
Dept: DERMATOLOGY | Facility: CLINIC | Age: 67
End: 2022-06-06
Payer: OTHER GOVERNMENT

## 2022-06-06 PROCEDURE — 99203 OFFICE O/P NEW LOW 30 MIN: CPT

## 2022-06-06 PROCEDURE — 99072 ADDL SUPL MATRL&STAF TM PHE: CPT

## 2022-06-08 ENCOUNTER — APPOINTMENT (OUTPATIENT)
Dept: DERMATOLOGY | Facility: CLINIC | Age: 67
End: 2022-06-08
Payer: OTHER GOVERNMENT

## 2022-06-08 DIAGNOSIS — C44.319 BASAL CELL CARCINOMA OF SKIN OF OTHER PARTS OF FACE: ICD-10-CM

## 2022-06-08 PROCEDURE — 12052 INTMD RPR FACE/MM 2.6-5.0 CM: CPT

## 2022-06-08 PROCEDURE — 99072 ADDL SUPL MATRL&STAF TM PHE: CPT

## 2022-06-08 PROCEDURE — 17311 MOHS 1 STAGE H/N/HF/G: CPT

## 2022-06-08 PROCEDURE — 17312 MOHS ADDL STAGE: CPT

## 2022-06-08 RX ORDER — MUPIROCIN 20 MG/G
2 OINTMENT TOPICAL TWICE DAILY
Qty: 1 | Refills: 2 | Status: ACTIVE | COMMUNITY
Start: 2022-06-08 | End: 1900-01-01

## 2022-06-09 PROBLEM — C44.319 BASAL CELL CARCINOMA (BCC) OF LEFT CHEEK: Status: ACTIVE | Noted: 2022-06-07

## 2023-09-23 ENCOUNTER — NON-APPOINTMENT (OUTPATIENT)
Age: 68
End: 2023-09-23